# Patient Record
Sex: FEMALE | Race: AMERICAN INDIAN OR ALASKA NATIVE
[De-identification: names, ages, dates, MRNs, and addresses within clinical notes are randomized per-mention and may not be internally consistent; named-entity substitution may affect disease eponyms.]

---

## 2017-07-13 NOTE — MAGNETIC RESONANCE REPORT
MRI scan of brain:



History: Memory loss.



Technique: Multiplanar, multisequence images were obtained without 

contrast injection.



Findings:



No evidence of restricted diffusion. Ventricles are normal in size and 

midline in location. Moderate volume loss.



No evidence of acute ischemia, hemorrhage or mass. No extra-axial fluid 

collection. No brain stem and cerebellum.

Bilateral mastoiditis.



Impression:



Moderate volume loss. 

No acute intracranial abnormality.

Incidentally noted acute bilateral mastoiditis.

## 2018-08-23 ENCOUNTER — HOSPITAL ENCOUNTER (EMERGENCY)
Dept: HOSPITAL 5 - ED | Age: 63
Discharge: LEFT BEFORE DISCHARGE | End: 2018-08-23
Payer: MEDICAID

## 2018-08-23 DIAGNOSIS — Z53.21: ICD-10-CM

## 2018-08-23 DIAGNOSIS — R09.89: Primary | ICD-10-CM
